# Patient Record
(demographics unavailable — no encounter records)

---

## 2024-12-18 NOTE — HISTORY OF PRESENT ILLNESS
[FreeTextEntry1] : DAVE [de-identified] : Following with Hematology for Anemia. and receiving infusion therapy.  She states that her energy levels have improved.

## 2024-12-18 NOTE — HISTORY OF PRESENT ILLNESS
[FreeTextEntry1] : DAVE [de-identified] : Following with Hematology for Anemia. and receiving infusion therapy.  She states that her energy levels have improved.

## 2024-12-18 NOTE — ASSESSMENT
[FreeTextEntry1] : , , Preventative: Counseled on health promotion and disease prevention. EKG and wellness labs done Follow-up with OBGYN for Annual Well woman exam / Pap Heart Screen:  EKG nsr  Anemia: Hb = 7.9--->10.9 improvement following with Hematology for infusions.   PreDiabetes: HbA1C % = 5.7 Counseled patient to cut down on processed sugar and carbohydrates. Increase Aerobic exercise and resistance training.

## 2025-01-08 NOTE — PHYSICAL EXAM
[Chaperone Present] : A chaperone was present in the examining room during all aspects of the physical examination [[id number]] : A chaperone was present during the pelvic exam. [FreeTextEntry2] : ANTONIO [Appropriately responsive] : appropriately responsive [Alert] : alert [No Acute Distress] : no acute distress [Soft] : soft [Non-tender] : non-tender [Non-distended] : non-distended [Oriented x3] : oriented x3 [Labia Majora] : normal [Labia Minora] : normal [Discharge] : a  ~M vaginal discharge was present [Normal] : normal [Uterine Adnexae] : normal [FreeTextEntry5] : no CMT

## 2025-01-08 NOTE — HISTORY OF PRESENT ILLNESS
[FreeTextEntry1] : HPI   Patient presents for an acute visit c/o of vaginal itching and discomfort c/o of vulvar itching  denies dysuria onset 1 wk ago tried using vinegar and water last night which really burned  She is wondering if this is from using condoms during sex   LMP:  24   Last pap:  2023 neg cytology, neg hpv  ALL: PCN  --------------------------------------------------------------------------------------------------------- ASSESSMENT & PLAN:   37 y/o   #discharge #r/o UTI -vaginitis and gc/c  -ucx -diflucan -vulvovaginal care reviewed   rto 25 gyn annual   Dr. Trish Aguiar DO, MPH, FACOG

## 2025-01-29 NOTE — HISTORY OF PRESENT ILLNESS
[de-identified] : KRISSY AL is a 35 y/o female without significant past medical history, who presents for evaluation of anemia and thrombocytosis.   Patient has a history of Hbg 7.9 on 6/12/2024 and 8.8 on 12/23/24, prior to that hb in 10.5-11 range since June 2019. Patient did have an Hb of 8 in August 2015 and 2019.  She was  seen in [place] cancer and blood in 2015 and 2016. Patient states PO iron causes her constipation, which is relieved with stool softener. She has received IV iron prior, well tolerated.   She states has a history of heavy periods but states this has resolved since starting Nuvaring. She did stop Nuvaring earlier this year and resumed it last March 2024.She denies history of blood transfusion. She has a 18 y/o son.   Today she states feels tired, short of breath, dizziness and palpitations  06/12/2024 CBC: WBC 7.02 K, RBC 3.73 M/uL HBG 7.9 g/dL, HCT 27.9 %,  K, ANC 3.48 ALC 2.41 06/12/2024: Iron 24, ITBC 533, UIBC 509, Sat 5% 06/12/2024: B12 422, Folate 8.0, Ferritin 9  PMH: Hives PSxH: D/C, gastric bypass (7/2023)- [facility]: History of heavy periods, resolved after Nuvaring, currently on Nuvaring.  Social Hx: Non smoker, No drugs, Social Alcohol   [de-identified] : Patient presents for follow up s/p weekly Venofer x 5 on 7/29/24 Patient feeling better since IV iron. States dizziness and fatigue resolved after tx.  Patient currently using Nuvaring to help with heavy periods  and periods much lighter since nov 2025 Not taking oral iron due to constipation   HB in Sept 2024 10.9  In dec 2024 10.7  WIll repeat labs today

## 2025-01-29 NOTE — ASSESSMENT
[FreeTextEntry1] : KRISSY AL is a 37 y/o female who presents for evaluation of anemia and thrombocytosis.   Patient has a history of Hbg 7.9 on 6/12/2024 and 8.8 on 12/23/24, prior to that hb in 10.5-11 range since June 2019. Patient did have an Hb of 8 in August 2015 and 2019.   PO iron causes her constipation. She has received IV iron prior, well tolerated. She was  seen in [place] cancer and blood in 2015 and 2016. s/p Gastric Bypass July 2023 with [facility]  06/12/2024 CBC: WBC 7.02 K, RBC 3.73 M/uL HBG 7.9 g/dL, HCT 27.9 %,  K, ANC 3.48 ALC 2.41 06/12/2024: Iron 24, ITBC 533, UIBC 509, Sat 5% 06/12/2024: B12 422, Folate 8.0, Ferritin 9  Today's CBC: WBC 5.7, Hg 10.9, HCT 34.8 , plt 399   Plan #iron deficiency anemia secondary to heavy period #thrombocytosis secondary to anemia  - Has received IV Ferraheme in the past ( 2016 and 2019??)  - Continue oral B12 1000 mcg qd - s/p weekly Venofer x 5 on 7/29/24 - Hg 10.9 in sept 2024 and 10.7 in dec 2024  - She is not taking oral iron  - will repeat labs today If worsening will consider IV iron  - f/u with byron in 3 months

## 2025-02-01 NOTE — PLAN
[FreeTextEntry1] : Irregular bleeding - continue nuvaring - check pelvic US- will call with results - pap obtained  self breast exams encouraged  pap obtained

## 2025-02-01 NOTE — PHYSICAL EXAM
[Chaperone Present] : A chaperone was present in the examining room during all aspects of the physical examination [[id number]] : A chaperone was present during the pelvic exam. [Appropriately responsive] : appropriately responsive [Alert] : alert [No Acute Distress] : no acute distress [Soft] : soft [Non-tender] : non-tender [Non-distended] : non-distended [No Lesions] : no lesions [No Mass] : no mass [Oriented x3] : oriented x3 [Examination Of The Breasts] : a normal appearance [No Masses] : no breast masses were palpable [Labia Majora] : normal [Labia Minora] : normal [Normal] : normal [Uterine Adnexae] : normal [FreeTextEntry2] : ANTONIO Tolbert

## 2025-02-28 NOTE — PHYSICAL EXAM
[Chaperone Present] : A chaperone was present in the examining room during all aspects of the physical examination [FreeTextEntry2] : ANTONIO Rojas  [Appropriately responsive] : appropriately responsive [Alert] : alert [No Acute Distress] : no acute distress [Oriented x3] : oriented x3 [Labia Majora] : normal [Labia Minora] : normal [No Bleeding] : There was no active vaginal bleeding [Normal] : normal [Tenderness] : nontender [Uterine Adnexae] : non-palpable [FreeTextEntry5] : ectopy

## 2025-02-28 NOTE — PLAN
[FreeTextEntry1] : -F/u vaginal and urine culture  -Rx Metronidazole gel for presumptive treatment for BV  -Reviewed good vulvovaginal hygiene habits  -Call or RTO PRN for any new problems, questions or concerns

## 2025-02-28 NOTE — HISTORY OF PRESENT ILLNESS
[Intravaginal Ring] : uses the intravaginal ring [Y] : Positive pregnancy history [Menarche Age: ____] : age at menarche was [unfilled] [No] : Patient does not have concerns regarding sex [Currently Active] : currently active [Men] : men [PapSmeardate] : 02/01/25 [TextBox_31] : NEG [GonorrheaDate] : 12/06/23 [TextBox_63] : NEG [ChlamydiaDate] : 12/06/23 [TextBox_68] : NEG [HPVDate] : 02/01/25 [TextBox_78] : NEG [LMPDate] : 02/15/25 [PGHxTotal] : 2 [[facility] Hospital[facility]] : 1 [[facility] Hospital[facility]] : 1 [PGHxABSpont] : 1 [FreeTextEntry1] : 02/15/25

## 2025-04-22 NOTE — HISTORY OF PRESENT ILLNESS
[de-identified] : KRISSY AL is a 37 y/o female without significant past medical history, who presents for evaluation of anemia and thrombocytosis.   Patient has a history of Hbg 7.9 on 6/12/2024 and 8.8 on 12/23/24, prior to that hb in 10.5-11 range since June 2019. Patient did have an Hb of 8 in August 2015 and 2019.  She was  seen in [place] and blood in 2015 and 2016. Patient states PO iron causes her constipation, which is relieved with stool softener. She has received IV iron prior, well tolerated.   She states has a history of heavy periods but states this has resolved since starting Nuvaring. She did stop Nuvaring earlier this year and resumed it last March 2024.She denies history of blood transfusion. She has a 18 y/o son.   Today she states feels tired, short of breath, dizziness and palpitations  06/12/2024 CBC: WBC 7.02 K, RBC 3.73 M/uL HBG 7.9 g/dL, HCT 27.9 %,  K, ANC 3.48 ALC 2.41 06/12/2024: Iron 24, ITBC 533, UIBC 509, Sat 5% 06/12/2024: B12 422, Folate 8.0, Ferritin 9  PMH: Hives PSxH: D/C, gastric bypass (7/2023)- [facility]/OB: History of heavy periods, resolved after Nuvaring, currently on Nuvaring.  Social Hx: Non smoker, No drugs, Social Alcohol   [de-identified] : Patient presents for follow up s/p weekly Venofer x 5 on 7/29/24 s/p 1 dose Venofer on 2/2/25  Patient currently using Nuvaring to help with heavy periods  and periods much lighter since nov 2025 Not taking oral iron due to constipation  Patient feels well, offers no acute complaints  Today's CBC: WBC 5.88, Hg 10.9 , HCT 33.7 , plt 422  HB in Sept 2024 10.9  In dec 2024 10.7

## 2025-04-22 NOTE — ASSESSMENT
[FreeTextEntry1] : KRISSY AL is a 35 y/o female who presents for evaluation of anemia and thrombocytosis.   Patient has a history of Hbg 7.9 on 6/12/2024 and 8.8 on 12/23/24, prior to that hb in 10.5-11 range since June 2019. Patient did have an Hb of 8 in August 2015 and 2019.   PO iron causes her constipation. She has received IV iron prior, well tolerated. She was  seen in [place] and blood in 2015 and 2016. s/p Gastric Bypass July 2023 with [facility]  06/12/2024 CBC: WBC 7.02 K, RBC 3.73 M/uL HBG 7.9 g/dL, HCT 27.9 %,  K, ANC 3.48 ALC 2.41 06/12/2024: Iron 24, ITBC 533, UIBC 509, Sat 5% 06/12/2024: B12 422, Folate 8.0, Ferritin 9    Plan #iron deficiency anemia secondary to heavy period #thrombocytosis secondary to anemia  - Has received IV Ferraheme in the past ( 2016 and 2019??)  - Continue oral B12 1000 mcg qd - s/p weekly Venofer x 5 on 7/29/24 - s/p 1 dose Venofer on 2/2/25 - Today's CBC: WBC 5.88, Hg 10.9 , HCT 33.7 , plt 422 - She is not taking oral iron  - Discussed increasing dietary iron  - F/U in 3 months

## 2025-04-22 NOTE — HISTORY OF PRESENT ILLNESS
[de-identified] : KRISSY AL is a 37 y/o female without significant past medical history, who presents for evaluation of anemia and thrombocytosis.   Patient has a history of Hbg 7.9 on 6/12/2024 and 8.8 on 12/23/24, prior to that hb in 10.5-11 range since June 2019. Patient did have an Hb of 8 in August 2015 and 2019.  She was  seen in [place] in 2015 and 2016. Patient states PO iron causes her constipation, which is relieved with stool softener. She has received IV iron prior, well tolerated.   She states has a history of heavy periods but states this has resolved since starting Nuvaring. She did stop Nuvaring earlier this year and resumed it last March 2024.She denies history of blood transfusion. She has a 20 y/o son.   Today she states feels tired, short of breath, dizziness and palpitations  06/12/2024 CBC: WBC 7.02 K, RBC 3.73 M/uL HBG 7.9 g/dL, HCT 27.9 %,  K, ANC 3.48 ALC 2.41 06/12/2024: Iron 24, ITBC 533, UIBC 509, Sat 5% 06/12/2024: B12 422, Folate 8.0, Ferritin 9  PMH: Hives PSxH: D/C, gastric bypass (7/2023)- [facility]/OB: History of heavy periods, resolved after Nuvaring, currently on Nuvaring.  Social Hx: Non smoker, No drugs, Social Alcohol   [de-identified] : Patient presents for follow up s/p weekly Venofer x 5 on 7/29/24 s/p 1 dose Venofer on 2/2/25  Patient currently using Nuvaring to help with heavy periods  and periods much lighter since nov 2025 Not taking oral iron due to constipation  Patient feels well, offers no acute complaints  Today's CBC: WBC 5.88, Hg 10.9 , HCT 33.7 , plt 422  HB in Sept 2024 10.9  In dec 2024 10.7

## 2025-04-22 NOTE — HISTORY OF PRESENT ILLNESS
[de-identified] : KRISSY AL is a 35 y/o female without significant past medical history, who presents for evaluation of anemia and thrombocytosis.   Patient has a history of Hbg 7.9 on 6/12/2024 and 8.8 on 12/23/24, prior to that hb in 10.5-11 range since June 2019. Patient did have an Hb of 8 in August 2015 and 2019.  She was  seen in [place] in 2015 and 2016. Patient states PO iron causes her constipation, which is relieved with stool softener. She has received IV iron prior, well tolerated.   She states has a history of heavy periods but states this has resolved since starting Nuvaring. She did stop Nuvaring earlier this year and resumed it last March 2024.She denies history of blood transfusion. She has a 18 y/o son.   Today she states feels tired, short of breath, dizziness and palpitations  06/12/2024 CBC: WBC 7.02 K, RBC 3.73 M/uL HBG 7.9 g/dL, HCT 27.9 %,  K, ANC 3.48 ALC 2.41 06/12/2024: Iron 24, ITBC 533, UIBC 509, Sat 5% 06/12/2024: B12 422, Folate 8.0, Ferritin 9  PMH: Hives PSxH: D/C, gastric bypass (7/2023)- [facility]/OB: History of heavy periods, resolved after Nuvaring, currently on Nuvaring.  Social Hx: Non smoker, No drugs, Social Alcohol   [de-identified] : Patient presents for follow up s/p weekly Venofer x 5 on 7/29/24 s/p 1 dose Venofer on 2/2/25  Patient currently using Nuvaring to help with heavy periods  and periods much lighter since nov 2025 Not taking oral iron due to constipation  Patient feels well, offers no acute complaints  Today's CBC: WBC 5.88, Hg 10.9 , HCT 33.7 , plt 422  HB in Sept 2024 10.9  In dec 2024 10.7

## 2025-04-22 NOTE — ASSESSMENT
[FreeTextEntry1] : KRISSY AL is a 37 y/o female who presents for evaluation of anemia and thrombocytosis.   Patient has a history of Hbg 7.9 on 6/12/2024 and 8.8 on 12/23/24, prior to that hb in 10.5-11 range since June 2019. Patient did have an Hb of 8 in August 2015 and 2019.   PO iron causes her constipation. She has received IV iron prior, well tolerated. She was  seen in [place] and blood in 2015 and 2016. s/p Gastric Bypass July 2023 with [facility]  06/12/2024 CBC: WBC 7.02 K, RBC 3.73 M/uL HBG 7.9 g/dL, HCT 27.9 %,  K, ANC 3.48 ALC 2.41 06/12/2024: Iron 24, ITBC 533, UIBC 509, Sat 5% 06/12/2024: B12 422, Folate 8.0, Ferritin 9    Plan #iron deficiency anemia secondary to heavy period #thrombocytosis secondary to anemia  - Has received IV Ferraheme in the past ( 2016 and 2019??)  - Continue oral B12 1000 mcg qd - s/p weekly Venofer x 5 on 7/29/24 - s/p 1 dose Venofer on 2/2/25 - Today's CBC: WBC 5.88, Hg 10.9 , HCT 33.7 , plt 422 - She is not taking oral iron  - Discussed increasing dietary iron  - F/U in 3 months